# Patient Record
Sex: FEMALE | Race: OTHER | Employment: OTHER | URBAN - METROPOLITAN AREA
[De-identification: names, ages, dates, MRNs, and addresses within clinical notes are randomized per-mention and may not be internally consistent; named-entity substitution may affect disease eponyms.]

---

## 2017-06-05 NOTE — PATIENT DISCUSSION
(H30.9831) Nonexudative age-related macular degeneration bilateral jamie - Assesment : Examination revealed AMD Dry.-MILD - Plan : Monitor for changes. Advised patient to call our office with decreased vision or increased distortion. Patient advised to check [employer] regularly (once weekly or more) and use nutraceuticals such as AREDS 2 eye vitamins.  Wear sunglasses when outdoors and eat green, leafy vegetables to maintain ocular health. 1 YEAR/ EXAM/ MAC OCT

## 2017-06-05 NOTE — PATIENT DISCUSSION
(H35.715) Drusen (degenerative) of macula, bilateral - Assesment : Examination revealed Drusen. - Plan : Monitor for changes. Advised patient to call our office with decreased vision or increased symptoms. Fundus photo taken.

## 2018-01-05 ENCOUNTER — ESTABLISHED COMPREHENSIVE EXAM (OUTPATIENT)
Dept: URBAN - METROPOLITAN AREA CLINIC 43 | Facility: CLINIC | Age: 73
End: 2018-01-05

## 2018-01-05 DIAGNOSIS — H40.013: ICD-10-CM

## 2018-01-05 DIAGNOSIS — Z96.1: ICD-10-CM

## 2018-01-05 PROCEDURE — 1036F TOBACCO NON-USER: CPT

## 2018-01-05 PROCEDURE — G8428 CUR MEDS NOT DOCUMENT: HCPCS

## 2018-01-05 PROCEDURE — 92014 COMPRE OPH EXAM EST PT 1/>: CPT

## 2018-01-05 PROCEDURE — 92015 DETERMINE REFRACTIVE STATE: CPT

## 2018-01-05 PROCEDURE — G8785 BP SCRN NO PERF AT INTERVAL: HCPCS

## 2018-01-05 ASSESSMENT — VISUAL ACUITY
OS_CC: 20/25-2
OS_SC: J1+
OD_CC: 20/30
OD_SC: 20/30-1
OD_SC: J3
OS_SC: 20/200

## 2018-01-05 ASSESSMENT — TONOMETRY
OS_IOP_MMHG: 17
OD_IOP_MMHG: 17

## 2018-10-04 NOTE — PATIENT DISCUSSION
(H40.013) Open angle with borderline findings, low risk, bilateral - Assesment : Examination revealed suspicion for Open Angle Glaucoma. IOP OU WNL TODAY.   C/D ASYMMETRY WITH IL DEFINED CUP OS - Plan : 3-4 MONTHS, 24-2/ ON OCT

## 2018-10-04 NOTE — PATIENT DISCUSSION
(L33.2626) Nonexudative age-related macular degeneration bilateral jamie - Assesment : Examination revealed AMD Dry.-MILD - Plan : Monitor for changes. Advised patient to call our office with decreased vision or increased distortion. Patient advised to check [employer] regularly (once weekly or more) and use nutraceuticals such as AREDS 2 eye vitamins.  Wear sunglasses when outdoors and eat green, leafy vegetables to maintain ocular health. 1 YEAR/ EXAM/ NERVE PHOTOS

## 2019-01-04 ENCOUNTER — ESTABLISHED COMPREHENSIVE EXAM (OUTPATIENT)
Dept: URBAN - METROPOLITAN AREA CLINIC 43 | Facility: CLINIC | Age: 74
End: 2019-01-04

## 2019-01-04 DIAGNOSIS — H40.013: ICD-10-CM

## 2019-01-04 DIAGNOSIS — H04.123: ICD-10-CM

## 2019-01-04 DIAGNOSIS — Z96.1: ICD-10-CM

## 2019-01-04 PROCEDURE — G8785 BP SCRN NO PERF AT INTERVAL: HCPCS

## 2019-01-04 PROCEDURE — 1036F TOBACCO NON-USER: CPT

## 2019-01-04 PROCEDURE — 92015 DETERMINE REFRACTIVE STATE: CPT

## 2019-01-04 PROCEDURE — G8427 DOCREV CUR MEDS BY ELIG CLIN: HCPCS

## 2019-01-04 PROCEDURE — G9903 PT SCRN TBCO ID AS NON USER: HCPCS

## 2019-01-04 PROCEDURE — 92014 COMPRE OPH EXAM EST PT 1/>: CPT

## 2019-01-04 ASSESSMENT — VISUAL ACUITY
OS_CC: 20/25+2
OS_SC: J1
OD_CC: 20/25-3
OD_SC: J8
OS_SC: 20/400
OD_SC: 20/30-1

## 2019-01-04 ASSESSMENT — TONOMETRY
OD_IOP_MMHG: 16
OS_IOP_MMHG: 16

## 2019-04-18 NOTE — PATIENT DISCUSSION
(H40.013) Open angle with borderline findings, low risk, bilateral - Assesment : Examination revealed suspicion for Open Angle Glaucoma. LOW RISK IOP OU WNL TODAY. C/D ASYMMETRY - Plan : OBSERVE  6 MONTH EXAM Visual field performed.

## 2019-04-18 NOTE — PATIENT DISCUSSION
(I21.7519) Nonexudative age-related macular degeneration bilateral jamie - Assesment : Examination revealed AMD Dry.-MILD - Plan : Monitor for changes. Advised patient to call our office with decreased vision or increased distortion. Patient advised to check [employer] regularly (once weekly or more) and use nutraceuticals such as AREDS 2 eye vitamins. Wear sunglasses when outdoors and eat green, leafy vegetables to maintain ocular health.

## 2020-01-07 ENCOUNTER — ESTABLISHED COMPREHENSIVE EXAM (OUTPATIENT)
Dept: URBAN - METROPOLITAN AREA CLINIC 43 | Facility: CLINIC | Age: 75
End: 2020-01-07

## 2020-01-07 DIAGNOSIS — H52.203: ICD-10-CM

## 2020-01-07 DIAGNOSIS — H52.4: ICD-10-CM

## 2020-01-07 DIAGNOSIS — H52.13: ICD-10-CM

## 2020-01-07 PROCEDURE — 92015 DETERMINE REFRACTIVE STATE: CPT

## 2020-01-07 PROCEDURE — 92014 COMPRE OPH EXAM EST PT 1/>: CPT

## 2020-01-07 RX ORDER — ERYTHROMYCIN 5 MG/G: OINTMENT OPHTHALMIC EVERY EVENING

## 2020-01-07 ASSESSMENT — VISUAL ACUITY
OS_CC: 20/30+1
OD_CC: 20/25-2
OS_SC: J1
OS_SC: 20/200
OD_SC: 20/30-2
OD_SC: J2-

## 2020-01-07 ASSESSMENT — TONOMETRY
OS_IOP_MMHG: 14
OD_IOP_MMHG: 16

## 2021-01-08 ENCOUNTER — ESTABLISHED COMPREHENSIVE EXAM (OUTPATIENT)
Dept: URBAN - METROPOLITAN AREA CLINIC 43 | Facility: CLINIC | Age: 76
End: 2021-01-08

## 2021-01-08 DIAGNOSIS — H52.203: ICD-10-CM

## 2021-01-08 DIAGNOSIS — H52.4: ICD-10-CM

## 2021-01-08 DIAGNOSIS — H40.013: ICD-10-CM

## 2021-01-08 DIAGNOSIS — H52.13: ICD-10-CM

## 2021-01-08 DIAGNOSIS — Z96.1: ICD-10-CM

## 2021-01-08 DIAGNOSIS — H04.123: ICD-10-CM

## 2021-01-08 PROCEDURE — 92014 COMPRE OPH EXAM EST PT 1/>: CPT

## 2021-01-08 PROCEDURE — 92015 DETERMINE REFRACTIVE STATE: CPT

## 2021-01-08 ASSESSMENT — VISUAL ACUITY
OS_SC: 20/200
OD_CC: 20/30-2
OD_SC: 20/30-2
OD_SC: J6-
OS_CC: 20/40-1
OS_SC: J1

## 2021-01-08 ASSESSMENT — TONOMETRY
OS_IOP_MMHG: 16
OD_IOP_MMHG: 14

## 2021-09-22 NOTE — PROCEDURE NOTE: CLINICAL
PROCEDURE NOTE: Biopsy Skin Lesion OD. Diagnosis: Neoplasm of Uncertain Behavior (Eye). Anesthesia: Subconjunctival Lidocaine. Prior to treatment, the risks/benefits/alternatives were discussed. The patient wished to proceed with procedure. After the risks, benefits, and alternatives were explained to the patient, informed consent was obtained. Topical proparacaine drops were instilled in the eye. 2% Lidocaine jelly was instilled in the eye. The eyelid skin was prepped with an alcohol wipe. A subcutaneous injection of 2% lidocaine with 1:100,000 epinephrine was administered via a TB syringe and 27 g needle. A reasonable period of time was allowed for anesthesia to take effect. The patient, procedure, and the correct site were identified. A clamp was placed on the eyelid. The lesion was grasped with forceps and excised with scissors while taking care to preserve as much of the tarsus and lid margin as reasonably possible while still permitting an adequate and sizeable specimen for pathology review and confirmation of clean margins. Patient tolerated procedure well. There were no complications. Post procedure instructions given. Isael Velázquez

## 2022-02-23 ENCOUNTER — COMPREHENSIVE EXAM (OUTPATIENT)
Dept: URBAN - METROPOLITAN AREA CLINIC 43 | Facility: CLINIC | Age: 77
End: 2022-02-23

## 2022-02-23 DIAGNOSIS — Z96.1: ICD-10-CM

## 2022-02-23 DIAGNOSIS — H52.13: ICD-10-CM

## 2022-02-23 DIAGNOSIS — H52.203: ICD-10-CM

## 2022-02-23 DIAGNOSIS — H40.013: ICD-10-CM

## 2022-02-23 DIAGNOSIS — H04.123: ICD-10-CM

## 2022-02-23 DIAGNOSIS — H52.4: ICD-10-CM

## 2022-02-23 PROCEDURE — 92014 COMPRE OPH EXAM EST PT 1/>: CPT

## 2022-02-23 PROCEDURE — 92015 DETERMINE REFRACTIVE STATE: CPT

## 2022-02-23 ASSESSMENT — VISUAL ACUITY
OS_SC: 20/200
OD_CC: 20/20-2
OS_SC: J1
OD_SC: 20/25-2
OD_SC: J3-
OS_CC: 20/20

## 2022-02-23 ASSESSMENT — TONOMETRY
OS_IOP_MMHG: 15
OD_IOP_MMHG: 15

## 2022-08-11 NOTE — PATIENT DISCUSSION
The risks, benefits, and alternatives to treatment with Botox® were discussed. Recommend using at's and increase if pt notices an increased in blinking .

## 2022-08-11 NOTE — PROCEDURE NOTE: CLINICAL
PROCEDURE NOTE: Botox, Cosmetic OU. Diagnosis: Brow Ptosis. Prior to treatment, the risks/benefits/alternatives were discussed. The patient wished to proceed with procedure. Refer to template for location and amounts of injections. Lot #: [id number] null. Expiration Date: 03/2025 . EXP. Inventory Id: [id number] null. Total Units Used: 50 . Total Units Wasted: 0. Patient tolerated procedure well. There were no complications. Post procedure instructions given. Deb Lu

## 2022-11-16 NOTE — PATIENT DISCUSSION
Pt did not notice any improvement with previous botox done on 8/11/22  . May consider increasing dosage . Pt to return for further cosmetic botox with no charge .

## 2022-12-16 NOTE — PROCEDURE NOTE: CLINICAL
PROCEDURE NOTE: Botox, Cosmetic Prior to treatment, the risks/benefits/alternatives were discussed. The patient wished to proceed with procedure. Refer to template for location and amounts of injections. Lot #: [id number]. Expiration Date: [id number]:00:00. Inventory Id: null. Total Units Used: 37.5 units. Diluted to 4u/cc Patient tolerated procedure well. There were no complications. Post procedure instructions given. Sb So

## 2022-12-16 NOTE — PATIENT DISCUSSION
Pt did not notice any improvement with previous botox done on 8/11/22 in brow area. Pt returned for further cosmetic botox with no charge after returning to [facility] from [facility].

## 2022-12-16 NOTE — PATIENT DISCUSSION
Discussed limitations of Botox. Cosmetic Botox treated to primarily Brow area. Changed dilution. Will monitor pt response in 2-3 weeks.

## 2024-03-18 ENCOUNTER — COMPREHENSIVE EXAM (OUTPATIENT)
Dept: URBAN - METROPOLITAN AREA CLINIC 43 | Facility: CLINIC | Age: 79
End: 2024-03-18

## 2024-03-18 DIAGNOSIS — H52.13: ICD-10-CM

## 2024-03-18 DIAGNOSIS — H52.203: ICD-10-CM

## 2024-03-18 DIAGNOSIS — Z96.1: ICD-10-CM

## 2024-03-18 DIAGNOSIS — H04.123: ICD-10-CM

## 2024-03-18 DIAGNOSIS — H52.4: ICD-10-CM

## 2024-03-18 DIAGNOSIS — H40.013: ICD-10-CM

## 2024-03-18 PROCEDURE — 92014 COMPRE OPH EXAM EST PT 1/>: CPT

## 2024-03-18 PROCEDURE — 92015 DETERMINE REFRACTIVE STATE: CPT

## 2024-03-18 ASSESSMENT — VISUAL ACUITY
OS_SC: J1
OD_SC: J10
OD_SC: 20/25-1
OS_SC: 20/200

## 2024-03-18 ASSESSMENT — TONOMETRY: OS_IOP_MMHG: 15

## 2025-01-15 ENCOUNTER — COMPREHENSIVE EXAM (OUTPATIENT)
Age: 80
End: 2025-01-15

## 2025-01-15 DIAGNOSIS — H04.123: ICD-10-CM

## 2025-01-15 DIAGNOSIS — H52.13: ICD-10-CM

## 2025-01-15 DIAGNOSIS — H52.4: ICD-10-CM

## 2025-01-15 DIAGNOSIS — H52.203: ICD-10-CM

## 2025-01-15 DIAGNOSIS — H40.013: ICD-10-CM

## 2025-01-15 DIAGNOSIS — Z96.1: ICD-10-CM

## 2025-01-15 PROCEDURE — 92015 DETERMINE REFRACTIVE STATE: CPT

## 2025-01-15 PROCEDURE — 92014 COMPRE OPH EXAM EST PT 1/>: CPT

## 2025-01-15 RX ORDER — ERYTHROMYCIN 5 MG/G: OINTMENT OPHTHALMIC EVERY EVENING
